# Patient Record
Sex: FEMALE | URBAN - METROPOLITAN AREA
[De-identification: names, ages, dates, MRNs, and addresses within clinical notes are randomized per-mention and may not be internally consistent; named-entity substitution may affect disease eponyms.]

---
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
## 2023-03-21 ENCOUNTER — HOSPITAL ENCOUNTER (OUTPATIENT)
Age: 23
Discharge: HOME | End: 2023-03-21

## 2023-03-21 PROCEDURE — 86481 TB AG RESPONSE T-CELL SUSP: CPT

## 2023-03-26 LAB — T-SPOT TB TEST: NORMAL

## 2023-08-11 NOTE — PROCEDURE: TREATMENT REGIMEN
Abdomen , soft, nontender, nondistended , no guarding or rigidity , no masses palpable , normal bowel sounds , Liver and Spleen , no hepatomegaly present , liver nontender , spleen not palpable, No Ascites, No hernia
Continue Regimen: Aczone 5% Gel once daily; sulfacetamide sodium sulfur wash once daily in the shower
Otc Regimen: Differin 0.1% Gel at night (every other night if too dry)
Detail Level: Simple
Plan: Consultation with  for acne scarring if interested